# Patient Record
Sex: FEMALE | Race: ASIAN | NOT HISPANIC OR LATINO
[De-identification: names, ages, dates, MRNs, and addresses within clinical notes are randomized per-mention and may not be internally consistent; named-entity substitution may affect disease eponyms.]

---

## 2017-04-28 ENCOUNTER — APPOINTMENT (OUTPATIENT)
Dept: MRI IMAGING | Facility: CLINIC | Age: 72
End: 2017-04-28

## 2017-04-28 ENCOUNTER — OUTPATIENT (OUTPATIENT)
Dept: OUTPATIENT SERVICES | Facility: HOSPITAL | Age: 72
LOS: 1 days | End: 2017-04-28
Payer: COMMERCIAL

## 2017-04-28 DIAGNOSIS — M15.0 PRIMARY GENERALIZED (OSTEO)ARTHRITIS: ICD-10-CM

## 2017-04-28 DIAGNOSIS — M67.911 UNSPECIFIED DISORDER OF SYNOVIUM AND TENDON, RIGHT SHOULDER: ICD-10-CM

## 2017-04-28 PROCEDURE — 73221 MRI JOINT UPR EXTREM W/O DYE: CPT

## 2017-04-28 PROCEDURE — 73721 MRI JNT OF LWR EXTRE W/O DYE: CPT

## 2018-10-09 ENCOUNTER — APPOINTMENT (OUTPATIENT)
Dept: MRI IMAGING | Facility: CLINIC | Age: 73
End: 2018-10-09

## 2018-10-09 ENCOUNTER — OUTPATIENT (OUTPATIENT)
Dept: OUTPATIENT SERVICES | Facility: HOSPITAL | Age: 73
LOS: 1 days | End: 2018-10-09
Payer: MEDICARE

## 2018-10-09 DIAGNOSIS — Z00.8 ENCOUNTER FOR OTHER GENERAL EXAMINATION: ICD-10-CM

## 2018-10-09 PROCEDURE — 73221 MRI JOINT UPR EXTREM W/O DYE: CPT

## 2018-10-09 PROCEDURE — 73221 MRI JOINT UPR EXTREM W/O DYE: CPT | Mod: 26,RT

## 2019-07-23 ENCOUNTER — APPOINTMENT (OUTPATIENT)
Dept: SURGICAL ONCOLOGY | Facility: CLINIC | Age: 74
End: 2019-07-23
Payer: MEDICARE

## 2019-07-23 VITALS
RESPIRATION RATE: 15 BRPM | WEIGHT: 140 LBS | BODY MASS INDEX: 26.43 KG/M2 | OXYGEN SATURATION: 100 % | SYSTOLIC BLOOD PRESSURE: 149 MMHG | HEIGHT: 61 IN | HEART RATE: 92 BPM | DIASTOLIC BLOOD PRESSURE: 92 MMHG

## 2019-07-23 DIAGNOSIS — N63.10 UNSPECIFIED LUMP IN THE RIGHT BREAST, UNSPECIFIED QUADRANT: ICD-10-CM

## 2019-07-23 DIAGNOSIS — Z87.39 PERSONAL HISTORY OF OTHER DISEASES OF THE MUSCULOSKELETAL SYSTEM AND CONNECTIVE TISSUE: ICD-10-CM

## 2019-07-23 PROCEDURE — 99203 OFFICE O/P NEW LOW 30 MIN: CPT

## 2019-07-29 NOTE — ASSESSMENT
[FreeTextEntry1] : 74 year old female with personal history of bilateral breast biopsies with benign findings.  She went for her annual mammogram and was noted with an asymmetry in the right breast for which targeted imaging was recommended.  She went for a right breast mammo/sono on 5/29/19 and was noted with heterogeneous masses in the outer right breast at 9:00 measuring 13 and 14mm (probable fibroadenomas) with an adjacent marker (Birads 0).  MRI was recommended which was completed on 6/9/19 and she was noted with a probably benign intramammary LN measuring 5mm for which 6 month follow up MRI was recommended.  No abnormal lesions were noted to correlate to the breast US and therefore a repeat breast US in 6 months was recommended (Birads 3).  She denies palpable breast masses, nipple discharge, skin changes, inversion or breast pain. Denies constitutional symptoms.  Sanna usually has her breast imaging completed in [place] however it was completed in [place] without CDs of prior imaging for comparison.\par \par Plan:\par Will have mammo/sono CDs uploaded.\par Patient will request MRI CD from [place].  Once all images obtained will have reviewed in house by Radiology for their recommendation.\par Sanna will RTO in 1 month to discuss plan of care.\par I have discussed the diagnosis, therapeutic plan and options with the patient at length. Patient expressed verbal understanding to proceed with proposed plan. All questions answered.\par

## 2019-07-29 NOTE — PHYSICAL EXAM
[Normal] : supple, no neck mass and thyroid not enlarged [Normal Supraclavicular Lymph Nodes] : normal supraclavicular lymph nodes [Normal Axillary Lymph Nodes] : normal axillary lymph nodes [Normal] : oriented to person, place and time, with appropriate affect [de-identified] : Normal breast inspection and palpation of axillas. No dominant mass or nipple discharge bilaterally.

## 2019-07-29 NOTE — HISTORY OF PRESENT ILLNESS
[de-identified] : Ms. Tillman is a 74 year old female who presents today for an initial consultation. \par \par Sanna has a personal history of bilateral breast biopsies with benign findings.  She went for her annual mammogram and was noted with an asymmetry in the right breast for which targeted imaging was recommended.  She went for a right breast mammo/sono on 19 and was noted with heterogeneous masses in the outer right breast at 9:00 measuring 13 and 14mm (probable fibroadenomas) with an adjacent marker (Birads 0).  MRI was recommended which was completed on 19 and she was noted with a probably benign intramammary LN measuring 5mm for which 6 month follow up MRI was recommended.  No abnormal lesions were noted to correlate to the breast US and therefore a repeat breast US in 6 months was recommended (Birads 3).  She denies palpable breast masses, nipple discharge, skin changes, inversion or breast pain. Denies constitutional symptoms.  Sanna usually has her breast imaging completed in [place] however it was completed in [place] without CDs of prior imaging for comparison.\par \par PMH otherwise significant for HLD and OA.\par Family history of breast cancer in her maternal aunt at 68 y.o.\par  \par Menarche: 16 y.o.\par LMP: \par \par Age at first pregnancy: 36 y.o.\par \par Internist: Deondre Warner MD

## 2019-07-29 NOTE — CONSULT LETTER
[Dear  ___] : Dear  [unfilled], [Please see my note below.] : Please see my note below. [Consult Letter:] : I had the pleasure of evaluating your patient, [unfilled]. [Consult Closing:] : Thank you very much for allowing me to participate in the care of this patient.  If you have any questions, please do not hesitate to contact me. [Sincerely,] : Sincerely, [FreeTextEntry2] : Deondre Warner MD  [FreeTextEntry3] : Orlando Nielson MD, FICS, FACS\par , Surgical Oncology\par [facility]\par [address]\[place]- [address]\par \par [address]\[place], [place]- [address]\par (mob) [phone]\par (o) [phone]\par (f) [phone]

## 2019-08-27 ENCOUNTER — APPOINTMENT (OUTPATIENT)
Dept: SURGICAL ONCOLOGY | Facility: CLINIC | Age: 74
End: 2019-08-27
Payer: MEDICARE

## 2019-08-27 ENCOUNTER — TRANSCRIPTION ENCOUNTER (OUTPATIENT)
Age: 74
End: 2019-08-27

## 2019-08-27 VITALS
HEART RATE: 96 BPM | RESPIRATION RATE: 16 BRPM | HEIGHT: 61 IN | DIASTOLIC BLOOD PRESSURE: 78 MMHG | OXYGEN SATURATION: 98 % | WEIGHT: 138 LBS | SYSTOLIC BLOOD PRESSURE: 131 MMHG | BODY MASS INDEX: 26.06 KG/M2

## 2019-08-27 PROCEDURE — 99214 OFFICE O/P EST MOD 30 MIN: CPT

## 2019-08-27 NOTE — CONSULT LETTER
[Dear  ___] : Dear  [unfilled], [Consult Letter:] : I had the pleasure of evaluating your patient, [unfilled]. [( Thank you for referring [unfilled] for consultation for _____ )] : Thank you for referring [unfilled] for consultation for [unfilled] [Please see my note below.] : Please see my note below. [Consult Closing:] : Thank you very much for allowing me to participate in the care of this patient.  If you have any questions, please do not hesitate to contact me. [Sincerely,] : Sincerely, [FreeTextEntry2] : Deondre Warner MD  [FreeTextEntry3] : Orlando Nielson MD, FICS, FACS\par , Surgical Oncology\par [facility]\par [address]\[place]- [address]\par \par [address]\[place]- [address]\par (mob) [phone]\par (o) [phone]\par (f) [phone]

## 2019-08-27 NOTE — PHYSICAL EXAM
[Normal] : supple, no neck mass and thyroid not enlarged [Normal Supraclavicular Lymph Nodes] : normal supraclavicular lymph nodes [Normal Axillary Lymph Nodes] : normal axillary lymph nodes [Normal] : oriented to person, place and time, with appropriate affect [de-identified] : Normal breast inspection and palpation of axillas. No dominant mass or nipple discharge bilaterally.

## 2019-08-27 NOTE — HISTORY OF PRESENT ILLNESS
[de-identified] : Ms. Tillman is a 74 year old female who presents today for follow u p visit after review of her breast imaging by [facility]. No new complaints\par Sanna has a personal history of bilateral breast biopsies with benign findings.  She went for her annual mammogram and was noted with an asymmetry in the right breast for which targeted imaging was recommended.  She went for a right breast mammo/sono on 19 and was noted with heterogeneous masses in the outer right breast at 9:00 measuring 13 and 14mm (probable fibroadenomas) with an adjacent marker (Birads 0).  MRI was recommended which was completed on 19 and she was noted with a probably benign intramammary LN measuring 5mm for which 6 month follow up MRI was recommended.  No abnormal lesions were noted to correlate to the breast US and therefore a repeat breast US in 6 months was recommended (Birads 3).  She denies palpable breast masses, nipple discharge, skin changes, inversion or breast pain. Denies constitutional symptoms.  Sanna usually has her breast imaging completed in [place] however it was completed in [place] without CDs of prior imaging for comparison.\par \par PMH otherwise significant for HLD and OA.\par Family history of breast cancer in her maternal aunt at 68 y.o.\par  \par Menarche: 16 y.o.\par LMP: \par \par Age at first pregnancy: 36 y.o.\par \par Internist: Deondre Warner MD

## 2019-08-27 NOTE — ASSESSMENT
[FreeTextEntry1] : 74 year old female with personal history of bilateral breast biopsies with benign findings.  She went for her annual mammogram and was noted with an asymmetry in the right breast for which targeted imaging was recommended.  She went for a right breast mammo/sono on 5/29/19 and was noted with heterogeneous masses in the outer right breast at 9:00 measuring 13 and 14mm (probable fibroadenomas) with an adjacent marker (Birads 0).  MRI was recommended which was completed on 6/9/19 and she was noted with a probably benign intramammary LN measuring 5mm for which 6 month follow up MRI was recommended.  No abnormal lesions were noted to correlate to the breast US and therefore a repeat breast US in 6 months was recommended (Birads 3).  She denies palpable breast masses, nipple discharge, skin changes, inversion or breast pain. Denies constitutional symptoms.  aSnna usually has her breast imaging completed in [place] however it was completed in [place] without CDs of prior imaging for comparison. MRI breast reviewed at [facility]- No significant findings noted. A benign appearing lymph node in right breast/axilla- no indication for biopsy at this time\par \par Plan:\par Will repeat MRI breast in 6 months to ensure no significant changes,and if lesion appears concerning will proceed with CNB for tissue diagnosis\par I have discussed the diagnosis, therapeutic plan and options with the patient at length. Patient expressed verbal understanding to proceed with proposed plan. All questions answered.\par Pt will be in [place] in December and knows to contact me after MRI\par RTO after next imaging VIEW ALL

## 2021-06-23 ENCOUNTER — OUTPATIENT (OUTPATIENT)
Dept: OUTPATIENT SERVICES | Facility: HOSPITAL | Age: 76
LOS: 1 days | End: 2021-06-23
Payer: MEDICARE

## 2021-06-23 VITALS
SYSTOLIC BLOOD PRESSURE: 150 MMHG | DIASTOLIC BLOOD PRESSURE: 94 MMHG | HEART RATE: 72 BPM | WEIGHT: 132.06 LBS | HEIGHT: 59 IN | OXYGEN SATURATION: 99 % | TEMPERATURE: 99 F

## 2021-06-23 DIAGNOSIS — R03.0 ELEVATED BLOOD-PRESSURE READING, WITHOUT DIAGNOSIS OF HYPERTENSION: ICD-10-CM

## 2021-06-23 DIAGNOSIS — G56.02 CARPAL TUNNEL SYNDROME, LEFT UPPER LIMB: ICD-10-CM

## 2021-06-23 DIAGNOSIS — Z98.49 CATARACT EXTRACTION STATUS, UNSPECIFIED EYE: Chronic | ICD-10-CM

## 2021-06-23 DIAGNOSIS — Z98.890 OTHER SPECIFIED POSTPROCEDURAL STATES: Chronic | ICD-10-CM

## 2021-06-23 PROCEDURE — 93010 ELECTROCARDIOGRAM REPORT: CPT

## 2021-06-23 NOTE — H&P PST ADULT - NEGATIVE PSYCHIATRIC SYMPTOMS
no suicidal ideation/no depression/no anxiety/no insomnia/no paranoia/no auditory hallucinations/no hyperactivity no suicidal ideation/no depression/no anxiety/no insomnia/no memory loss/no paranoia/no auditory hallucinations/no hyperactivity

## 2021-06-23 NOTE — H&P PST ADULT - MS GEN HX ROS MEA POS PC
left hand/joint, bilateral knees;/arthritis/joint pain/neck pain left hand/joint, bilateral knees;/arthritis/joint pain/stiffness/neck pain

## 2021-06-23 NOTE — H&P PST ADULT - NSICDXPROBLEM_GEN_ALL_CORE_FT
PROBLEM DIAGNOSES  Problem: Carpal tunnel syndrome, left upper limb  Assessment and Plan: Scheduled for left basal joint arthroplasty, endoscopic carpal tunnel release on 07/12/2021. Pre op instructions, famotidine, chlorhexidine gluconate soap given and explained. Pt verbalized understanding.    Problem: Elevated BP without diagnosis of hypertension  Assessment and Plan: /94 during PST, pt denies any headache, change in LOC, change in vision, dizziness of any other discomfort.  Pt instructed to see PCP for further BP evaluation pre op  Pending medical consultation

## 2021-06-23 NOTE — H&P PST ADULT - NEGATIVE NEUROLOGICAL SYMPTOMS
no difficulty walking/no headache no tremors/no vertigo/no loss of sensation/no difficulty walking/no headache

## 2021-06-23 NOTE — H&P PST ADULT - NEGATIVE GASTROINTESTINAL SYMPTOMS
no nausea/no vomiting/no diarrhea/no constipation/no change in bowel habits/no abdominal pain/no jaundice/no hiccoughs no nausea/no vomiting/no diarrhea/no constipation/no change in bowel habits/no abdominal pain/no melena/no jaundice/no hiccoughs

## 2021-06-23 NOTE — H&P PST ADULT - HISTORY OF PRESENT ILLNESS
77 y/o female presents to PST for pre op evaluation with 1 year h/o numbness and tingling sensation in fingertips, mild discomfort, difficulty opening jar. Scheduled for left basal joint arthroplasty 75 y/o female presents to PST for pre op evaluation with 1 year h/o numbness and tingling sensation in fingertips, mild discomfort, difficulty opening jar. Scheduled for left basal joint arthroplasty, endoscopic carpal tunnel release on 07/12/2021

## 2021-06-23 NOTE — H&P PST ADULT - MUSCULOSKELETAL
details… left thumb/no joint erythema/no joint warmth/no calf tenderness/decreased ROM due to pain detailed exam

## 2021-06-23 NOTE — H&P PST ADULT - NSANTHOSAYNRD_GEN_A_CORE
No. CARMELA screening performed.  STOP BANG Legend: 0-2 = LOW Risk; 3-4 = INTERMEDIATE Risk; 5-8 = HIGH Risk

## 2021-06-23 NOTE — H&P PST ADULT - NSICDXPASTSURGICALHX_GEN_ALL_CORE_FT
PAST SURGICAL HISTORY:  History of repair of right rotator cuff biceps tendom repaired;  2017     PAST SURGICAL HISTORY:  History of cataract extraction bilateral    History of repair of right rotator cuff biceps tendom repaired;  2017

## 2021-07-13 PROBLEM — Z78.9 OTHER SPECIFIED HEALTH STATUS: Chronic | Status: ACTIVE | Noted: 2021-06-23

## 2021-07-16 VITALS
DIASTOLIC BLOOD PRESSURE: 74 MMHG | SYSTOLIC BLOOD PRESSURE: 129 MMHG | TEMPERATURE: 98 F | HEART RATE: 72 BPM | OXYGEN SATURATION: 99 % | HEIGHT: 59 IN | WEIGHT: 132.06 LBS | RESPIRATION RATE: 16 BRPM

## 2021-07-18 ENCOUNTER — TRANSCRIPTION ENCOUNTER (OUTPATIENT)
Age: 76
End: 2021-07-18

## 2021-07-19 ENCOUNTER — OUTPATIENT (OUTPATIENT)
Dept: OUTPATIENT SERVICES | Facility: HOSPITAL | Age: 76
LOS: 1 days | Discharge: HOME | End: 2021-07-19
Payer: MEDICARE

## 2021-07-19 ENCOUNTER — RESULT REVIEW (OUTPATIENT)
Age: 76
End: 2021-07-19

## 2021-07-19 VITALS
RESPIRATION RATE: 15 BRPM | SYSTOLIC BLOOD PRESSURE: 147 MMHG | TEMPERATURE: 98 F | OXYGEN SATURATION: 100 % | HEART RATE: 62 BPM | DIASTOLIC BLOOD PRESSURE: 68 MMHG

## 2021-07-19 DIAGNOSIS — Z98.890 OTHER SPECIFIED POSTPROCEDURAL STATES: Chronic | ICD-10-CM

## 2021-07-19 DIAGNOSIS — G56.02 CARPAL TUNNEL SYNDROME, LEFT UPPER LIMB: ICD-10-CM

## 2021-07-19 DIAGNOSIS — Z98.49 CATARACT EXTRACTION STATUS, UNSPECIFIED EYE: Chronic | ICD-10-CM

## 2021-07-19 PROCEDURE — 88311 DECALCIFY TISSUE: CPT | Mod: 26

## 2021-07-19 PROCEDURE — 88304 TISSUE EXAM BY PATHOLOGIST: CPT | Mod: 26

## 2021-07-19 RX ORDER — ROSUVASTATIN CALCIUM 5 MG/1
1 TABLET ORAL
Qty: 0 | Refills: 0 | DISCHARGE

## 2021-07-19 RX ORDER — FAMOTIDINE 10 MG/ML
0 INJECTION INTRAVENOUS
Qty: 0 | Refills: 0 | DISCHARGE

## 2021-07-19 RX ORDER — ESOMEPRAZOLE MAGNESIUM 40 MG/1
1 CAPSULE, DELAYED RELEASE ORAL
Qty: 0 | Refills: 0 | DISCHARGE

## 2021-07-19 NOTE — ASU DISCHARGE PLAN (ADULT/PEDIATRIC) - NURSING INSTRUCTIONS
You were given IV Tylenol (1000mg) for pain management in the Operating Room.  Please do NOT take any additional Tylenol/Acetaminophen products (Percocet, Vicodin, Excedrin) for the next 6-8 hours (after  3PM TODAY 7/19/2021). Please do not take Tylenol AND Percocet/Vicodin/Excedrin as narcotic prescription already contains Tylenol.

## 2021-07-19 NOTE — BRIEF OPERATIVE NOTE - OPERATION/FINDINGS
left thumb basal joint arthroplasty suspensionoplasty with internal brace  left endoscopic carpal tunnel release

## 2021-07-19 NOTE — ASU DISCHARGE PLAN (ADULT/PEDIATRIC) - CARE PROVIDER_API CALL
García Stacy)  Orthopaedic Surgery; Surgery of the Hand  [address], [address]  [place] [address]  Phone: [phone]  Fax: [phone]  Follow Up Time:

## 2021-07-19 NOTE — BRIEF OPERATIVE NOTE - NSICDXBRIEFPROCEDURE_GEN_ALL_CORE_FT
PROCEDURES:  Arthroplasty of basal joint of left thumb 19-Jul-2021 09:38:28  Sung Stacy  Endoscopic carpal tunnel release 19-Jul-2021 09:38:50  Sung Stacy

## 2021-07-28 LAB — SURGICAL PATHOLOGY STUDY: SIGNIFICANT CHANGE UP

## 2021-09-14 ENCOUNTER — APPOINTMENT (OUTPATIENT)
Dept: OTOLARYNGOLOGY | Facility: CLINIC | Age: 76
End: 2021-09-14
Payer: MEDICARE

## 2021-09-14 VITALS
WEIGHT: 127 LBS | DIASTOLIC BLOOD PRESSURE: 80 MMHG | HEIGHT: 61 IN | SYSTOLIC BLOOD PRESSURE: 148 MMHG | BODY MASS INDEX: 23.98 KG/M2

## 2021-09-14 DIAGNOSIS — R49.0 DYSPHONIA: ICD-10-CM

## 2021-09-14 DIAGNOSIS — E78.00 PURE HYPERCHOLESTEROLEMIA, UNSPECIFIED: ICD-10-CM

## 2021-09-14 DIAGNOSIS — M47.811: ICD-10-CM

## 2021-09-14 PROCEDURE — 31579 LARYNGOSCOPY TELESCOPIC: CPT

## 2021-09-14 PROCEDURE — 99203 OFFICE O/P NEW LOW 30 MIN: CPT | Mod: 25

## 2021-09-14 RX ORDER — ROSUVASTATIN CALCIUM 5 MG/1
TABLET, FILM COATED ORAL
Refills: 0 | Status: ACTIVE | COMMUNITY

## 2021-09-14 NOTE — PROCEDURE
[Image(s) Captured] : image(s) captured and filed [Hoarseness] : hoarseness not clearly evaluated by indirect laryngoscopy [None] : none [Rigid Endoscope] : examined with a rigid endoscope [Normal] : normal [de-identified] : Storz laryngoscope: both vocal cords move well. in abduction and adduction. No definite tremor seen. No lesions of larynx

## 2021-09-14 NOTE — REASON FOR VISIT
[Initial Evaluation] : an initial evaluation for [Hoarseness/Dysphonia] : hoarseness [FreeTextEntry2] : dysphonia

## 2021-09-14 NOTE — HISTORY OF PRESENT ILLNESS
[de-identified] : 76 yr old presents for initial evaluation of dysphonia-  feels like it's an effort to speak x past 2 years-  getting worse.  [place].  Hx of tremors in left arm x 10 years - saw neurologist back 2013 - it hasn't progressed,  No c/o dysphagia.  Never smoked.

## 2021-10-02 ENCOUNTER — EMERGENCY (EMERGENCY)
Facility: HOSPITAL | Age: 76
LOS: 1 days | Discharge: HOME | End: 2021-10-02
Attending: EMERGENCY MEDICINE
Payer: MEDICARE

## 2021-10-02 VITALS
SYSTOLIC BLOOD PRESSURE: 159 MMHG | DIASTOLIC BLOOD PRESSURE: 102 MMHG | RESPIRATION RATE: 20 BRPM | OXYGEN SATURATION: 98 % | HEART RATE: 86 BPM

## 2021-10-02 VITALS
SYSTOLIC BLOOD PRESSURE: 200 MMHG | HEIGHT: 59 IN | RESPIRATION RATE: 18 BRPM | WEIGHT: 126.99 LBS | OXYGEN SATURATION: 100 % | HEART RATE: 85 BPM | DIASTOLIC BLOOD PRESSURE: 124 MMHG | TEMPERATURE: 98 F

## 2021-10-02 DIAGNOSIS — Z98.890 OTHER SPECIFIED POSTPROCEDURAL STATES: Chronic | ICD-10-CM

## 2021-10-02 DIAGNOSIS — Z98.49 CATARACT EXTRACTION STATUS, UNSPECIFIED EYE: Chronic | ICD-10-CM

## 2021-10-02 LAB
ALBUMIN SERPL ELPH-MCNC: 4.5 G/DL — SIGNIFICANT CHANGE UP (ref 3.3–5)
ALP SERPL-CCNC: 93 U/L — SIGNIFICANT CHANGE UP (ref 40–120)
ALT FLD-CCNC: 12 U/L — SIGNIFICANT CHANGE UP (ref 10–45)
ANION GAP SERPL CALC-SCNC: 12 MMOL/L — SIGNIFICANT CHANGE UP (ref 5–17)
AST SERPL-CCNC: 17 U/L — SIGNIFICANT CHANGE UP (ref 10–40)
BASOPHILS # BLD AUTO: 0.05 K/UL — SIGNIFICANT CHANGE UP (ref 0–0.2)
BASOPHILS NFR BLD AUTO: 0.6 % — SIGNIFICANT CHANGE UP (ref 0–2)
BILIRUB SERPL-MCNC: 0.5 MG/DL — SIGNIFICANT CHANGE UP (ref 0.2–1.2)
BUN SERPL-MCNC: 10 MG/DL — SIGNIFICANT CHANGE UP (ref 7–23)
CALCIUM SERPL-MCNC: 9.7 MG/DL — SIGNIFICANT CHANGE UP (ref 8.4–10.5)
CHLORIDE SERPL-SCNC: 94 MMOL/L — LOW (ref 96–108)
CO2 SERPL-SCNC: 24 MMOL/L — SIGNIFICANT CHANGE UP (ref 22–31)
CREAT SERPL-MCNC: 0.63 MG/DL — SIGNIFICANT CHANGE UP (ref 0.5–1.3)
EOSINOPHIL # BLD AUTO: 0.08 K/UL — SIGNIFICANT CHANGE UP (ref 0–0.5)
EOSINOPHIL NFR BLD AUTO: 1 % — SIGNIFICANT CHANGE UP (ref 0–6)
GLUCOSE SERPL-MCNC: 125 MG/DL — HIGH (ref 70–99)
HCT VFR BLD CALC: 35.4 % — SIGNIFICANT CHANGE UP (ref 34.5–45)
HGB BLD-MCNC: 11.7 G/DL — SIGNIFICANT CHANGE UP (ref 11.5–15.5)
IMM GRANULOCYTES NFR BLD AUTO: 0.4 % — SIGNIFICANT CHANGE UP (ref 0–1.5)
LYMPHOCYTES # BLD AUTO: 1.64 K/UL — SIGNIFICANT CHANGE UP (ref 1–3.3)
LYMPHOCYTES # BLD AUTO: 20.7 % — SIGNIFICANT CHANGE UP (ref 13–44)
MCHC RBC-ENTMCNC: 28 PG — SIGNIFICANT CHANGE UP (ref 27–34)
MCHC RBC-ENTMCNC: 33.1 GM/DL — SIGNIFICANT CHANGE UP (ref 32–36)
MCV RBC AUTO: 84.7 FL — SIGNIFICANT CHANGE UP (ref 80–100)
MONOCYTES # BLD AUTO: 0.49 K/UL — SIGNIFICANT CHANGE UP (ref 0–0.9)
MONOCYTES NFR BLD AUTO: 6.2 % — SIGNIFICANT CHANGE UP (ref 2–14)
NEUTROPHILS # BLD AUTO: 5.64 K/UL — SIGNIFICANT CHANGE UP (ref 1.8–7.4)
NEUTROPHILS NFR BLD AUTO: 71.1 % — SIGNIFICANT CHANGE UP (ref 43–77)
NRBC # BLD: 0 /100 WBCS — SIGNIFICANT CHANGE UP (ref 0–0)
PLATELET # BLD AUTO: 220 K/UL — SIGNIFICANT CHANGE UP (ref 150–400)
POTASSIUM SERPL-MCNC: 3.9 MMOL/L — SIGNIFICANT CHANGE UP (ref 3.5–5.3)
POTASSIUM SERPL-SCNC: 3.9 MMOL/L — SIGNIFICANT CHANGE UP (ref 3.5–5.3)
PROT SERPL-MCNC: 6.9 G/DL — SIGNIFICANT CHANGE UP (ref 6–8.3)
RBC # BLD: 4.18 M/UL — SIGNIFICANT CHANGE UP (ref 3.8–5.2)
RBC # FLD: 13.2 % — SIGNIFICANT CHANGE UP (ref 10.3–14.5)
SODIUM SERPL-SCNC: 130 MMOL/L — LOW (ref 135–145)
TROPONIN T, HIGH SENSITIVITY RESULT: <6 NG/L — SIGNIFICANT CHANGE UP (ref 0–51)
WBC # BLD: 7.93 K/UL — SIGNIFICANT CHANGE UP (ref 3.8–10.5)
WBC # FLD AUTO: 7.93 K/UL — SIGNIFICANT CHANGE UP (ref 3.8–10.5)

## 2021-10-02 PROCEDURE — 85025 COMPLETE CBC W/AUTO DIFF WBC: CPT

## 2021-10-02 PROCEDURE — 70450 CT HEAD/BRAIN W/O DYE: CPT | Mod: MA

## 2021-10-02 PROCEDURE — 84484 ASSAY OF TROPONIN QUANT: CPT

## 2021-10-02 PROCEDURE — 71046 X-RAY EXAM CHEST 2 VIEWS: CPT

## 2021-10-02 PROCEDURE — 36415 COLL VENOUS BLD VENIPUNCTURE: CPT

## 2021-10-02 PROCEDURE — 99285 EMERGENCY DEPT VISIT HI MDM: CPT

## 2021-10-02 PROCEDURE — 80053 COMPREHEN METABOLIC PANEL: CPT

## 2021-10-02 PROCEDURE — 93005 ELECTROCARDIOGRAM TRACING: CPT

## 2021-10-02 PROCEDURE — 71046 X-RAY EXAM CHEST 2 VIEWS: CPT | Mod: 26

## 2021-10-02 PROCEDURE — 96375 TX/PRO/DX INJ NEW DRUG ADDON: CPT

## 2021-10-02 PROCEDURE — 70450 CT HEAD/BRAIN W/O DYE: CPT | Mod: 26,MA

## 2021-10-02 PROCEDURE — 96374 THER/PROPH/DIAG INJ IV PUSH: CPT

## 2021-10-02 PROCEDURE — 99284 EMERGENCY DEPT VISIT MOD MDM: CPT | Mod: 25

## 2021-10-02 RX ORDER — LIDOCAINE 4 G/100G
10 CREAM TOPICAL ONCE
Refills: 0 | Status: COMPLETED | OUTPATIENT
Start: 2021-10-02 | End: 2021-10-02

## 2021-10-02 RX ORDER — FAMOTIDINE 10 MG/ML
20 INJECTION INTRAVENOUS ONCE
Refills: 0 | Status: COMPLETED | OUTPATIENT
Start: 2021-10-02 | End: 2021-10-02

## 2021-10-02 RX ORDER — ACETAMINOPHEN 500 MG
650 TABLET ORAL ONCE
Refills: 0 | Status: COMPLETED | OUTPATIENT
Start: 2021-10-02 | End: 2021-10-02

## 2021-10-02 RX ORDER — METOCLOPRAMIDE HCL 10 MG
10 TABLET ORAL ONCE
Refills: 0 | Status: COMPLETED | OUTPATIENT
Start: 2021-10-02 | End: 2021-10-02

## 2021-10-02 RX ADMIN — Medication 30 MILLILITER(S): at 20:50

## 2021-10-02 RX ADMIN — Medication 10 MILLIGRAM(S): at 20:55

## 2021-10-02 RX ADMIN — LIDOCAINE 10 MILLILITER(S): 4 CREAM TOPICAL at 20:51

## 2021-10-02 RX ADMIN — FAMOTIDINE 20 MILLIGRAM(S): 10 INJECTION INTRAVENOUS at 20:50

## 2021-10-02 NOTE — ED PROVIDER NOTE - OBJECTIVE STATEMENT
76 female, hx: HLD (Rosuvastatin - does not take be/c she doesn't believe in it), GERD, primary occipital headache. Presents w/cc: spicy burp, nausea, and indigestion after making aloo sydni for dinner last night. Reports this AM woke up with endorsement of gradual, progressive onset posterior, occipital headache which she normally gets when she eats spicy foods and has reflux. Denies any thunderclap etiology, blurry vision, vomiting, numbness/tingling/weakness in peripheral ext. Reports she took Tylenol which subsided her headache with some relief. Denies any fevers, chills, cough, CP, vomiting, diarrhea, constipation, bloody stools, dysuric symptoms. Reports she follows with Cardio MD - last had a stress/TTE which was WNL last month.

## 2021-10-02 NOTE — ED PROVIDER NOTE - ATTENDING CONTRIBUTION TO CARE
Pt with recurrent posterior headache, gradual onset, non-exertional, recurrent with certain diet per patient, no vision changes.  Neuro intact.

## 2021-10-02 NOTE — ED PROVIDER NOTE - PHYSICAL EXAMINATION
GEN - NAD; non-toxic; A+Ox3, speaking full sentences, steady gait   HENT - NC/AT, No visible Ecchymosis, No Abrasions, No Lac/Tears, MMM, no discharge  EYES - EOMI, PERRL, no conjunctival pallor, no scleral icterus  NECK - Neck supple, No LAD, No Swelling  PULM - CTA B/L,  symmetric breath sounds  CV -  RRR, S1 S2, no murmurs 2+ Pulses B/L UE  GI - (-) Morales's, (-) Rovsings, (-) McBurneys; NT/ND, soft, no guarding, no rebound, no masses    MSK/EXT- no CVA tenderness; no edema, no gross deformity, warm and well perfused, no calf tenderness/swelling/erythema   SKIN - no rash or bruising  NEUROLOGIC - alert, CN2-12 intact, sensation intact, moving all 4 ext with 5/5 Strength

## 2021-10-02 NOTE — ED PROVIDER NOTE - NSFOLLOWUPINSTRUCTIONS_ED_ALL_ED_FT
You were seen and evaluated in the Emergency Department for your headache.     Clinical examination and history demonstrated no acute evidence of any life-threatening risks to your health as a result of your headache. You received medication in the Emergency Department for your headache.     While emergent evaluation does not demonstrate any immediate life-threats, we strongly recommend you follow up with a Neurologist for further evaluation of your headache symptoms.     Should you develop nausea, vomiting, worsening headaches, inability to walk properly, numbness or tingling in the extremities, dizziness, or changes to your hearing/vision - please immediately return to the Emergency Department for evaluation of your symptoms.     If you do not have a Neurologist, you can call the following number to schedule an appointment with our Neurology partners:    [facility]  Neurology  [address] [place] [address] [address]rd [place] [address]  Phone: [phone]    Furthermore we recommend you see your Primary Care Physician for a comprehensive evaluation of your health within the next 48-72 hours.

## 2021-10-02 NOTE — ED PROVIDER NOTE - PROGRESS NOTE DETAILS
Dr. Ash Note: pt's headache 8/10-->3/10 after reglan, cth negative, BP improved with headache treatment, fed patient, feels better, refer for outpt neuro. Resident Karan: pt re-evaluated clinically, comfortable on re-exam. Preliminary evaluation without any acute, actionable pathology. Pt is tolerating PO intake, without any nausea/vomiting, with stable gait - will discharge home with followup care.

## 2021-10-02 NOTE — ED ADULT NURSE NOTE - OBJECTIVE STATEMENT
Pt is a 77 y/o female c/o headache in occipital region. States headache began earlier in afternoon pain 7/10, checked bp states and it was 220s/100s. States headache has improved now only 2/10. Denies hx of HTN states she typically is 130s systolic. Denies CP, SOB, N/V/D, numbness, tingling, cough, fever, chills, dizziness, weakness. A&Ox3. Breathing unlabored and spontaneous. Abdomen soft, nontender, nondistended. Full ROM and strength of extremities. Skin dry and intact. Safety and comfort measures provided, call bell provided to pt and bed in lowest position.

## 2021-10-02 NOTE — ED PROVIDER NOTE - NS ED ROS FT
Constitution: No Fever or chills, No Weight Loss,   Eyes: No visual changes  HEENT: No cough, No Discharge, No Rhinorrhea, No URI symptoms  Cardio: No Chest pain, No Palpitations, No Dyspnea  Resp: No SOB, No Wheezing  GI: No abdominal pain, (+) Nausea, No Vomiting, (+) Spicy AfterTaste; No Constipation, No Diarrhea  : No burning upon urination, trouble urinating, no foul odor from urine  MSK: No Back pain, No Numbness, No Tingling, No Weakness  Neuro: (+) Headache, No changes to Vision, No changes to Hearing, Normal Gait  Skin: No rashes, No Bruising, No Swelling

## 2021-10-02 NOTE — ED PROVIDER NOTE - CLINICAL SUMMARY MEDICAL DECISION MAKING FREE TEXT BOX
76 female, hx: HLD (Rosuvastatin - does not take be/c she doesn't believe in it), GERD, primary occipital headache. Presents w/cc: spicy burp, nausea, and indigestion after making aloo sydni for dinner last night, with subsequent gradual onset, progressive headache that she normally gets after eating spicy food. Exam, presentation, and history concerning for indigestion, complicated by primary headache - will r/o ACS (low suspicion given Neg Stress/TTE last month), electrolyte abnormalities. Eval is NOT consistent with HTN Emergency (no evidence of end organ damage on preliminary eval), ICH (denies thunderclap presentation, numbness/tingling/weakness in peripheral ext, gait stable, CN2-12, PERRL, EOMI, 5/5 Strength in B/L UE and LE). Plan: CBC, CMP, EKG, CXR, Trop, GI Cocktail, Reglan for H/A, Consider CT head to r/o intracranial mass/bleed (low suspicion). VSS, non-toxic.

## 2021-10-02 NOTE — ED PROVIDER NOTE - NSICDXPASTSURGICALHX_GEN_ALL_CORE_FT
PAST SURGICAL HISTORY:  History of cataract extraction bilateral    History of repair of right rotator cuff biceps tendom repaired;  2017

## 2021-10-08 ENCOUNTER — APPOINTMENT (OUTPATIENT)
Dept: GASTROENTEROLOGY | Facility: CLINIC | Age: 76
End: 2021-10-08
Payer: MEDICARE

## 2021-10-08 ENCOUNTER — RESULT REVIEW (OUTPATIENT)
Age: 76
End: 2021-10-08

## 2021-10-08 VITALS
SYSTOLIC BLOOD PRESSURE: 126 MMHG | WEIGHT: 123.25 LBS | BODY MASS INDEX: 24.2 KG/M2 | HEART RATE: 80 BPM | TEMPERATURE: 96.9 F | OXYGEN SATURATION: 99 % | HEIGHT: 60 IN | DIASTOLIC BLOOD PRESSURE: 80 MMHG

## 2021-10-08 DIAGNOSIS — K21.9 GASTRO-ESOPHAGEAL REFLUX DISEASE W/OUT ESOPHAGITIS: ICD-10-CM

## 2021-10-08 DIAGNOSIS — I10 ESSENTIAL (PRIMARY) HYPERTENSION: ICD-10-CM

## 2021-10-08 PROCEDURE — 99204 OFFICE O/P NEW MOD 45 MIN: CPT

## 2021-10-08 RX ORDER — MAGNESIUM OXIDE/MAG AA CHELATE 300 MG
CAPSULE ORAL
Refills: 0 | Status: ACTIVE | COMMUNITY

## 2021-10-08 RX ORDER — ESOMEPRAZOLE MAGNESIUM 20 MG/1
20 CAPSULE, DELAYED RELEASE ORAL
Refills: 0 | Status: ACTIVE | COMMUNITY

## 2021-10-08 RX ORDER — ZINC SULFATE 50(220)MG
CAPSULE ORAL
Refills: 0 | Status: ACTIVE | COMMUNITY

## 2021-10-08 RX ORDER — ROSUVASTATIN CALCIUM 10 MG/1
10 TABLET, FILM COATED ORAL
Refills: 0 | Status: ACTIVE | COMMUNITY

## 2021-10-08 RX ORDER — CASTOR OIL
OIL (ML) ORAL
Refills: 0 | Status: ACTIVE | COMMUNITY

## 2021-10-08 NOTE — HISTORY OF PRESENT ILLNESS
[Nausea] : denies nausea [Vomiting] : denies vomiting [Diarrhea] : denies diarrhea [Constipation] : denies constipation [Yellow Skin Or Eyes (Jaundice)] : denies jaundice [Abdominal Pain] : denies abdominal pain [Abdominal Swelling] : denies abdominal swelling [Rectal Pain] : denies rectal pain [Heartburn] : heartburn [GERD] : gastroesophageal reflux disease [Irritable Bowel Syndrome] : irritable bowel syndrome [Wt Gain ___ Lbs] : no recent weight gain [Wt Loss ___ Lbs] : no recent weight loss [Hiatus Hernia] : no hiatus hernia [Peptic Ulcer Disease] : no peptic ulcer disease [Pancreatitis] : no pancreatitis [Cholelithiasis] : no cholelithiasis [Kidney Stone] : no kidney stone [Inflammatory Bowel Disease] : no inflammatory bowel disease [Diverticulitis] : no diverticulitis [Alcohol Abuse] : no alcohol abuse [Malignancy] : no malignancy [Abdominal Surgery] : no abdominal surgery [Appendectomy] : no appendectomy [Cholecystectomy] : no cholecystectomy [de-identified] : Patient has longstanding history of gastroesophageal reflux disease presenting as regurgitation and metallic taste in the mouth.  She denies dysphagia, melena, hematemesis, unexplained weight loss.  Her bowel habits are normal without any recent change patient had upper endoscopy and colonoscopy about 5 years ago both were unremarkable.  There is a history of irritable bowel syndrome.  Patient has been taking Nexium 20 mg a day which helps now getting concerned because of the prolonged use of the Nexium.\par \par She has been to the emergency room with sudden onset severe headache and was found to have high blood pressure systolic above 200 though usually she is not hypertensive.  She had a CT head which was normal [de-identified] : Bloating intermittent abdominal distention.  Intermittent constipation. [de-identified] : Patient had shoulder surgery and ganglion removed from the wrist on right side. [FreeTextEntry1] : Patient denies history of hypertension, diabetes mellitus, MI, angina, CHF, TIA, CVA.

## 2021-10-08 NOTE — ASSESSMENT
[FreeTextEntry1] : Patient have chronic GERD she was advised to use Nexium 20 mg a day.  Along with take magnesium supplement every day calcium 1200 mg a day Vitamin D 50,000 units once a week (possible side effects explained).\par \par Intermittent episode of severe headache with accelerated hypertension check for pheochromocytoma.  Serum metanephrine level requested and 24-hour urine for catecholamines requested.  A CT abdomen also requested.\par \par \par

## 2021-10-09 ENCOUNTER — OUTPATIENT (OUTPATIENT)
Dept: OUTPATIENT SERVICES | Facility: HOSPITAL | Age: 76
LOS: 1 days | End: 2021-10-09
Payer: MEDICARE

## 2021-10-09 ENCOUNTER — APPOINTMENT (OUTPATIENT)
Dept: CT IMAGING | Facility: IMAGING CENTER | Age: 76
End: 2021-10-09
Payer: MEDICARE

## 2021-10-09 DIAGNOSIS — Z98.890 OTHER SPECIFIED POSTPROCEDURAL STATES: Chronic | ICD-10-CM

## 2021-10-09 DIAGNOSIS — Z98.49 CATARACT EXTRACTION STATUS, UNSPECIFIED EYE: Chronic | ICD-10-CM

## 2021-10-09 DIAGNOSIS — I10 ESSENTIAL (PRIMARY) HYPERTENSION: ICD-10-CM

## 2021-10-09 PROCEDURE — 74177 CT ABD & PELVIS W/CONTRAST: CPT

## 2021-10-09 PROCEDURE — 74177 CT ABD & PELVIS W/CONTRAST: CPT | Mod: 26,MH

## 2021-10-14 LAB
DOPAMINE UR-MCNC: 87 UG/L
DOPAMINE, UR, 24HR: 200 UG/24 HR
EPINEPH UR-MCNC: <1 UG/L
EPINEPHRINE, U, 24HR: <2 UG/24 HR
NOREPINEPH UR-MCNC: 14 UG/L
NOREPINEPHRINE,U,24H: 32 UG/24 HR

## 2021-10-15 LAB
METANEPHRINE, PL: <10 PG/ML
NORMETANEPHRINE, PL: 153 PG/ML
